# Patient Record
Sex: MALE | Race: BLACK OR AFRICAN AMERICAN
[De-identification: names, ages, dates, MRNs, and addresses within clinical notes are randomized per-mention and may not be internally consistent; named-entity substitution may affect disease eponyms.]

---

## 2018-01-16 ENCOUNTER — HOSPITAL ENCOUNTER (EMERGENCY)
Dept: HOSPITAL 94 - ER | Age: 53
Discharge: HOME | End: 2018-01-16
Payer: MEDICARE

## 2018-01-16 VITALS — HEIGHT: 69 IN | WEIGHT: 165.35 LBS | BODY MASS INDEX: 24.49 KG/M2

## 2018-01-16 VITALS — SYSTOLIC BLOOD PRESSURE: 121 MMHG | DIASTOLIC BLOOD PRESSURE: 85 MMHG

## 2018-01-16 DIAGNOSIS — Z86.19: ICD-10-CM

## 2018-01-16 DIAGNOSIS — G89.29: ICD-10-CM

## 2018-01-16 DIAGNOSIS — Z88.6: ICD-10-CM

## 2018-01-16 DIAGNOSIS — F15.10: ICD-10-CM

## 2018-01-16 DIAGNOSIS — Z88.1: ICD-10-CM

## 2018-01-16 DIAGNOSIS — F17.210: ICD-10-CM

## 2018-01-16 DIAGNOSIS — J44.0: ICD-10-CM

## 2018-01-16 DIAGNOSIS — Z59.0: ICD-10-CM

## 2018-01-16 DIAGNOSIS — Z88.8: ICD-10-CM

## 2018-01-16 DIAGNOSIS — J20.9: Primary | ICD-10-CM

## 2018-01-16 PROCEDURE — 99283 EMERGENCY DEPT VISIT LOW MDM: CPT

## 2018-01-16 SDOH — ECONOMIC STABILITY - HOUSING INSECURITY: HOMELESSNESS: Z59.0

## 2019-08-03 NOTE — NUR
I attempted to ambulate pt per PA's request. Pt is arousable but somnolent. He 
is mumbling responses to my questions and is not formulating complete 
sentences. Not able to ambulate pt at this time. Informed CAROL Ellis.